# Patient Record
Sex: FEMALE
[De-identification: names, ages, dates, MRNs, and addresses within clinical notes are randomized per-mention and may not be internally consistent; named-entity substitution may affect disease eponyms.]

---

## 2023-03-02 ENCOUNTER — TRANSCRIPTION ENCOUNTER (OUTPATIENT)
Age: 48
End: 2023-03-02

## 2023-03-02 VITALS
HEART RATE: 90 BPM | TEMPERATURE: 98 F | DIASTOLIC BLOOD PRESSURE: 98 MMHG | SYSTOLIC BLOOD PRESSURE: 160 MMHG | OXYGEN SATURATION: 99 % | RESPIRATION RATE: 18 BRPM | HEIGHT: 64 IN | WEIGHT: 123.9 LBS

## 2023-03-02 NOTE — ASU PATIENT PROFILE, ADULT - NSICDXPASTSURGICALHX_GEN_ALL_CORE_FT
PAST SURGICAL HISTORY:  H/O bilateral breast reduction surgery     S/P right knee arthroscopy     [place] teeth extracted

## 2023-03-02 NOTE — ASU PATIENT PROFILE, ADULT - NSICDXPASTMEDICALHX_GEN_ALL_CORE_FT
You are seen in the emergency room today for your shortness of breath and concerns over the last inhaler and spacer. We examined you, gave you a nebulizer treatment. Afterwards your breathing improved. We provided with prescription for albuterol and have handed you a new spacer. You have demonstrated to us that you understand how to use the inhaler and spacer. Please take 1 to 2 puffs every 4 hours as needed for shortness of breath and wheezing. You have informed us that you have an appointment with your PCP on 10/4/2022. Please attend this appointment to be evaluated and for follow-up. Please return emergency department soon as possible if you have worsening symptoms including shortness of breath, chest pain, abdominal pain, nausea, vomiting, loss of consciousness, or any other concerning signs or symptoms. PAST MEDICAL HISTORY:  History of IBS     HTN (hypertension)     Migraine

## 2023-03-02 NOTE — ASU PATIENT PROFILE, ADULT - REASON FOR ADMISSION, PROFILE
robotic assisted laparoscopic excision of endometriosis  , laparoscopic hysterectomy B/L salpingectomy

## 2023-03-03 ENCOUNTER — TRANSCRIPTION ENCOUNTER (OUTPATIENT)
Age: 48
End: 2023-03-03

## 2023-03-03 ENCOUNTER — INPATIENT (INPATIENT)
Facility: HOSPITAL | Age: 48
LOS: 0 days | Discharge: HOME | DRG: 743 | End: 2023-03-04
Attending: OBSTETRICS & GYNECOLOGY | Admitting: OBSTETRICS & GYNECOLOGY
Payer: COMMERCIAL

## 2023-03-03 DIAGNOSIS — Z98.890 OTHER SPECIFIED POSTPROCEDURAL STATES: Chronic | ICD-10-CM

## 2023-03-03 DIAGNOSIS — K08.409 PARTIAL LOSS OF TEETH, UNSPECIFIED CAUSE, UNSPECIFIED CLASS: Chronic | ICD-10-CM

## 2023-03-03 LAB
BLD GP AB SCN SERPL QL: NEGATIVE — SIGNIFICANT CHANGE UP
GLUCOSE BLDC GLUCOMTR-MCNC: 102 MG/DL — HIGH (ref 70–99)
RH IG SCN BLD-IMP: POSITIVE — SIGNIFICANT CHANGE UP

## 2023-03-03 PROCEDURE — 88305 TISSUE EXAM BY PATHOLOGIST: CPT | Mod: 26

## 2023-03-03 PROCEDURE — 88307 TISSUE EXAM BY PATHOLOGIST: CPT | Mod: 26

## 2023-03-03 DEVICE — URETERAL CATH OPEN END 5FR 70CM: Type: IMPLANTABLE DEVICE | Site: BILATERAL | Status: FUNCTIONAL

## 2023-03-03 RX ORDER — ACETAMINOPHEN 500 MG
1000 TABLET ORAL ONCE
Refills: 0 | Status: COMPLETED | OUTPATIENT
Start: 2023-03-03 | End: 2023-03-03

## 2023-03-03 RX ORDER — KETOROLAC TROMETHAMINE 30 MG/ML
30 SYRINGE (ML) INJECTION EVERY 6 HOURS
Refills: 0 | Status: DISCONTINUED | OUTPATIENT
Start: 2023-03-03 | End: 2023-03-04

## 2023-03-03 RX ORDER — PANTOPRAZOLE SODIUM 20 MG/1
40 TABLET, DELAYED RELEASE ORAL EVERY 24 HOURS
Refills: 0 | Status: DISCONTINUED | OUTPATIENT
Start: 2023-03-03 | End: 2023-03-04

## 2023-03-03 RX ORDER — HYDROMORPHONE HYDROCHLORIDE 2 MG/ML
0.5 INJECTION INTRAMUSCULAR; INTRAVENOUS; SUBCUTANEOUS EVERY 6 HOURS
Refills: 0 | Status: DISCONTINUED | OUTPATIENT
Start: 2023-03-03 | End: 2023-03-03

## 2023-03-03 RX ORDER — ACETAMINOPHEN 500 MG
975 TABLET ORAL EVERY 6 HOURS
Refills: 0 | Status: DISCONTINUED | OUTPATIENT
Start: 2023-03-04 | End: 2023-03-04

## 2023-03-03 RX ORDER — POLYETHYLENE GLYCOL 3350 17 G/17G
17 POWDER, FOR SOLUTION ORAL EVERY 24 HOURS
Refills: 0 | Status: DISCONTINUED | OUTPATIENT
Start: 2023-03-03 | End: 2023-03-04

## 2023-03-03 RX ORDER — ERGOCALCIFEROL 1.25 MG/1
1 CAPSULE ORAL
Qty: 0 | Refills: 0 | DISCHARGE

## 2023-03-03 RX ORDER — GABAPENTIN 400 MG/1
300 CAPSULE ORAL ONCE
Refills: 0 | Status: COMPLETED | OUTPATIENT
Start: 2023-03-03 | End: 2023-03-03

## 2023-03-03 RX ORDER — SODIUM CHLORIDE 9 MG/ML
1000 INJECTION, SOLUTION INTRAVENOUS
Refills: 0 | Status: DISCONTINUED | OUTPATIENT
Start: 2023-03-03 | End: 2023-03-04

## 2023-03-03 RX ORDER — ONDANSETRON 8 MG/1
8 TABLET, FILM COATED ORAL EVERY 6 HOURS
Refills: 0 | Status: DISCONTINUED | OUTPATIENT
Start: 2023-03-03 | End: 2023-03-04

## 2023-03-03 RX ORDER — SIMETHICONE 80 MG/1
80 TABLET, CHEWABLE ORAL ONCE
Refills: 0 | Status: COMPLETED | OUTPATIENT
Start: 2023-03-03 | End: 2023-03-03

## 2023-03-03 RX ORDER — SCOPALAMINE 1 MG/3D
1 PATCH, EXTENDED RELEASE TRANSDERMAL ONCE
Refills: 0 | Status: COMPLETED | OUTPATIENT
Start: 2023-03-03 | End: 2023-03-03

## 2023-03-03 RX ORDER — HYDROMORPHONE HYDROCHLORIDE 2 MG/ML
0.5 INJECTION INTRAMUSCULAR; INTRAVENOUS; SUBCUTANEOUS EVERY 4 HOURS
Refills: 0 | Status: DISCONTINUED | OUTPATIENT
Start: 2023-03-03 | End: 2023-03-04

## 2023-03-03 RX ORDER — HYDROCHLOROTHIAZIDE 25 MG
1 TABLET ORAL
Qty: 0 | Refills: 0 | DISCHARGE

## 2023-03-03 RX ORDER — KETOROLAC TROMETHAMINE 30 MG/ML
30 SYRINGE (ML) INJECTION ONCE
Refills: 0 | Status: DISCONTINUED | OUTPATIENT
Start: 2023-03-03 | End: 2023-03-03

## 2023-03-03 RX ORDER — ACETAMINOPHEN 500 MG
1000 TABLET ORAL ONCE
Refills: 0 | Status: DISCONTINUED | OUTPATIENT
Start: 2023-03-03 | End: 2023-03-03

## 2023-03-03 RX ORDER — HYDROMORPHONE HYDROCHLORIDE 2 MG/ML
0.5 INJECTION INTRAMUSCULAR; INTRAVENOUS; SUBCUTANEOUS
Refills: 0 | Status: DISCONTINUED | OUTPATIENT
Start: 2023-03-03 | End: 2023-03-03

## 2023-03-03 RX ADMIN — Medication 400 MILLIGRAM(S): at 20:06

## 2023-03-03 RX ADMIN — ONDANSETRON 8 MILLIGRAM(S): 8 TABLET, FILM COATED ORAL at 23:06

## 2023-03-03 RX ADMIN — Medication 1000 MILLIGRAM(S): at 09:38

## 2023-03-03 RX ADMIN — Medication 1000 MILLIGRAM(S): at 09:45

## 2023-03-03 RX ADMIN — GABAPENTIN 300 MILLIGRAM(S): 400 CAPSULE ORAL at 09:39

## 2023-03-03 RX ADMIN — SIMETHICONE 80 MILLIGRAM(S): 80 TABLET, CHEWABLE ORAL at 23:07

## 2023-03-03 NOTE — PROGRESS NOTE ADULT - ASSESSMENT
48 y/o s/p RA TLH + BS for 14 multifibroid uterus, endometriosis excision, cystoscopy w/ ICG, uncomplicated  The patient is a little dizzy, pain is well controlled, passed TOV.   Will reassess in an hour, if the patient feels better she can go home 46 y/o s/p RA TLH + BS for 14 multifibroid uterus, endometriosis excision, cystoscopy w/ ICG, uncomplicated  The patient is a little dizzy, pain is well controlled, passed TOV.   Will reassess in an hour, if the patient feels better she can go home

## 2023-03-03 NOTE — BRIEF OPERATIVE NOTE - NSICDXBRIEFPROCEDURE_GEN_ALL_CORE_FT
PROCEDURES:  Robot-assisted laparoscopic total hysterectomy with bilateral salpingectomy and cystoscopy using da Brandie Xi 03-Mar-2023 16:27:14  Beau Boykin  Robot-assisted laparoscopic surgical removal of endometriosis using da Brandie Xi 03-Mar-2023 16:27:37  Beau Boykin

## 2023-03-03 NOTE — PROGRESS NOTE ADULT - SUBJECTIVE AND OBJECTIVE BOX
GYN POST-OPERATIVE CHECK  Patient seen at bedside.  Pain controlled. Tolerating sips.  No OOB yet.  Fields in place.    Denies CP, palpitations, SOB, fever, chills, nausea, vomiting.    Vital Signs Last 24 Hrs  T(C): 36.3 (03 Mar 2023 17:06), Max: 36.7 (03 Mar 2023 12:28)  T(F): 97.3 (03 Mar 2023 17:06), Max: 97.3 (03 Mar 2023 17:06)  HR: 75 (03 Mar 2023 19:28) (70 - 90)  BP: 156/81 (03 Mar 2023 19:28) (148/83 - 175/97)  BP(mean): 111 (03 Mar 2023 19:28) (109 - 130)  RR: 19 (03 Mar 2023 19:28) (13 - 19)  SpO2: 99% (03 Mar 2023 19:28) (98% - 100%)    Parameters below as of 03 Mar 2023 19:28  Patient On (Oxygen Delivery Method): nasal cannula  O2 Flow (L/min): 2      Physical Exam:  Gen: No Acute Distress  Pulm: Comfortable on RA  GI: soft, appropriately tender, non-distended,  no rebound, no guarding.  Dressing C/D/I  : voiding  Ext: SCDs in place, no edema/erythema/tenderness    I&O's Summary    03 Mar 2023 07:01  -  03 Mar 2023 20:26  --------------------------------------------------------  IN: 375 mL / OUT: 400 mL / NET: -25 mL      MEDICATIONS  (STANDING):  acetaminophen     Tablet .. 1000 milliGRAM(s) Oral Once  HYDROmorphone  Injectable 0.5 milliGRAM(s) IV Push every 6 hours  ketorolac   Injectable 30 milliGRAM(s) IV Push Once  lactated ringers. 1000 milliLiter(s) (125 mL/Hr) IV Continuous <Continuous>  pantoprazole  Injectable 40 milliGRAM(s) IV Push every 24 hours  polyethylene glycol 3350 17 Gram(s) Oral every 24 hours  simethicone 80 milliGRAM(s) Chew Once    MEDICATIONS  (PRN):  HYDROmorphone  Injectable 0.5 milliGRAM(s) IV Push every 15 minutes PRN Severe Pain (7 - 10)  ondansetron Injectable 8 milliGRAM(s) IV Push every 6 hours PRN Nausea and/or Vomiting    Allergies    erythromycin (Vomiting)  Reglan (Vomiting)

## 2023-03-03 NOTE — H&P ADULT - ASSESSMENT
48 y/o G0 presenting for scheduled RA TLH + BS and endometriosis excision.    - Cleared for OR  - Consent signed  - NPO

## 2023-03-03 NOTE — BRIEF OPERATIVE NOTE - OPERATION/FINDINGS
Cystoscopy performed w/ b/l ICG injection, Cervix serially dilated and v-care inserted without difficulty. Abdomen insufflated w/ veress needle. 8mm robotic port placed in umbilicus w/ b/l 8mm ports and L lateral 5mm assist port. Robot docked. Pelvic exam significant for large multifibroid uterus, grossly normal appearing b/l fallopian tubes and ovaries. Large fundal fibroid removed prior to performing TLH. Uterus bisected after amputation and 6cm intramural fibroid removed in order to deliver uterus through the vagina. Bilateral salpingectomy then performed and tubes removed through the vagina. Vaginal cuff closed w/ v-lock suture. Multiple suspected endometriosis lesions dissected from pelvis. Robot undocked. Surgical pause performed, excellent hemostasis. Cystoscopy repeated and b/l ureteral jets visualized.

## 2023-03-03 NOTE — PRE-ANESTHESIA EVALUATION ADULT - NSANTHOSAYNRD_GEN_A_CORE
No. YURI screening performed.  STOP BANG Legend: 0-2 = LOW Risk; 3-4 = INTERMEDIATE Risk; 5-8 = HIGH Risk

## 2023-03-03 NOTE — ASU DISCHARGE PLAN (ADULT/PEDIATRIC) - NS MD DC FALL RISK RISK
For information on Fall & Injury Prevention, visit: [email or web address] OR  [email or web address] OR  https://www.cdc.gov/steadi/patient.html

## 2023-03-03 NOTE — H&P ADULT - HISTORY OF PRESENT ILLNESS
48 y/o G0 (LMP 2/10/23) presenting for scheduled RA hysterectomy and endometriosis excision. Pt is feeling well. Denies lightheadedness/dizziness, fevers/chills, chest pain, palpitations, SOB, abdominal pain, N/V/D, heavy vaginal bleeding, vagina itching/burning, abnormal vaginal discharge, urinary frequency/urgency, or dysuria.    ObHx: G0  GynHx: known fibroids, denies abnormal pap/STI/ovarian cysts  PMH:  46 y/o G0 (LMP 2/10/23) presenting for scheduled RA hysterectomy and endometriosis excision. Pt is feeling well. Denies lightheadedness/dizziness, fevers/chills, chest pain, palpitations, SOB, abdominal pain, N/V/D, heavy vaginal bleeding, vagina itching/burning, abnormal vaginal discharge, urinary frequency/urgency, or dysuria.    ObHx: G0  GynHx: known fibroids, denies abnormal pap/STI/ovarian cysts  PMH: hypocalcemia, HTN  PSH: dx l/s for endometriosis diagnosis  Meds: HCTZ 12.5mg QD  Allergies: "IV antinausea med" - spine pain 46 y/o G0 (LMP 2/10/23) presenting for scheduled RA hysterectomy and endometriosis excision. Pt is feeling well. Denies lightheadedness/dizziness, fevers/chills, chest pain, palpitations, SOB, abdominal pain, N/V/D, heavy vaginal bleeding, vagina itching/burning, abnormal vaginal discharge, urinary frequency/urgency, or dysuria.    ObHx: G0  GynHx: known fibroids, denies abnormal pap/STI/ovarian cysts  PMH: hypocalcemia, HTN  PSH: dx l/s for endometriosis diagnosis  Meds: HCTZ 12.5mg QD  Allergies: reglan - spine pain

## 2023-03-03 NOTE — BRIEF OPERATIVE NOTE - NSICDXBRIEFPOSTOP_GEN_ALL_CORE_FT
POST-OP DIAGNOSIS:  Fibroids 03-Mar-2023 16:28:14  Beau Boykin  Endometriosis 03-Mar-2023 16:28:22  Beau Boyikn  Pelvic pain 03-Mar-2023 16:28:29  Beau Boykin

## 2023-03-03 NOTE — BRIEF OPERATIVE NOTE - NSICDXBRIEFPREOP_GEN_ALL_CORE_FT
PRE-OP DIAGNOSIS:  Fibroids 03-Mar-2023 16:27:48  Beau Boykin  Endometriosis 03-Mar-2023 16:27:56  Beau Boykin  Pelvic pain 03-Mar-2023 16:28:03  Beau Boykin

## 2023-03-03 NOTE — ASU DISCHARGE PLAN (ADULT/PEDIATRIC) - CARE PROVIDER_API CALL
Sandra Gray)  Obstetrics and Gynecology  [address] [place] [address]  Phone: [phone]  Fax: [phone]  Established Patient  Follow Up Time:

## 2023-03-04 ENCOUNTER — TRANSCRIPTION ENCOUNTER (OUTPATIENT)
Age: 48
End: 2023-03-04

## 2023-03-04 VITALS
OXYGEN SATURATION: 95 % | TEMPERATURE: 98 F | DIASTOLIC BLOOD PRESSURE: 93 MMHG | SYSTOLIC BLOOD PRESSURE: 160 MMHG | RESPIRATION RATE: 16 BRPM | HEART RATE: 81 BPM

## 2023-03-04 LAB
ANION GAP SERPL CALC-SCNC: 10 MMOL/L — SIGNIFICANT CHANGE UP (ref 5–17)
BASOPHILS # BLD AUTO: 0.02 K/UL — SIGNIFICANT CHANGE UP (ref 0–0.2)
BASOPHILS NFR BLD AUTO: 0.1 % — SIGNIFICANT CHANGE UP (ref 0–2)
BILIRUB SERPL-MCNC: 0.4 MG/DL — SIGNIFICANT CHANGE UP (ref 0.2–1.2)
BUN SERPL-MCNC: 6 MG/DL — LOW (ref 7–23)
CALCIUM SERPL-MCNC: 8.6 MG/DL — SIGNIFICANT CHANGE UP (ref 8.4–10.5)
CHLORIDE SERPL-SCNC: 105 MMOL/L — SIGNIFICANT CHANGE UP (ref 96–108)
CO2 SERPL-SCNC: 25 MMOL/L — SIGNIFICANT CHANGE UP (ref 22–31)
CREAT SERPL-MCNC: 0.54 MG/DL — SIGNIFICANT CHANGE UP (ref 0.5–1.3)
EGFR: 114 ML/MIN/1.73M2 — SIGNIFICANT CHANGE UP
EOSINOPHIL # BLD AUTO: 0 K/UL — SIGNIFICANT CHANGE UP (ref 0–0.5)
EOSINOPHIL NFR BLD AUTO: 0 % — SIGNIFICANT CHANGE UP (ref 0–6)
GLUCOSE SERPL-MCNC: 82 MG/DL — SIGNIFICANT CHANGE UP (ref 70–99)
HCT VFR BLD CALC: 37.2 % — SIGNIFICANT CHANGE UP (ref 34.5–45)
HGB BLD-MCNC: 12 G/DL — SIGNIFICANT CHANGE UP (ref 11.5–15.5)
IMM GRANULOCYTES NFR BLD AUTO: 0.4 % — SIGNIFICANT CHANGE UP (ref 0–0.9)
INR BLD: 1.12 — SIGNIFICANT CHANGE UP (ref 0.88–1.16)
LYMPHOCYTES # BLD AUTO: 16.4 % — SIGNIFICANT CHANGE UP (ref 13–44)
LYMPHOCYTES # BLD AUTO: 2.21 K/UL — SIGNIFICANT CHANGE UP (ref 1–3.3)
MCHC RBC-ENTMCNC: 29.1 PG — SIGNIFICANT CHANGE UP (ref 27–34)
MCHC RBC-ENTMCNC: 32.3 GM/DL — SIGNIFICANT CHANGE UP (ref 32–36)
MCV RBC AUTO: 90.1 FL — SIGNIFICANT CHANGE UP (ref 80–100)
MELD SCORE WITH DIALYSIS: 21 POINTS — SIGNIFICANT CHANGE UP
MELD SCORE WITHOUT DIALYSIS: 8 POINTS — SIGNIFICANT CHANGE UP
MONOCYTES # BLD AUTO: 0.99 K/UL — HIGH (ref 0–0.9)
MONOCYTES NFR BLD AUTO: 7.3 % — SIGNIFICANT CHANGE UP (ref 2–14)
NEUTROPHILS # BLD AUTO: 10.24 K/UL — HIGH (ref 1.8–7.4)
NEUTROPHILS NFR BLD AUTO: 75.8 % — SIGNIFICANT CHANGE UP (ref 43–77)
NRBC # BLD: 0 /100 WBCS — SIGNIFICANT CHANGE UP (ref 0–0)
PLATELET # BLD AUTO: 280 K/UL — SIGNIFICANT CHANGE UP (ref 150–400)
POTASSIUM SERPL-MCNC: 3.2 MMOL/L — LOW (ref 3.5–5.3)
POTASSIUM SERPL-SCNC: 3.2 MMOL/L — LOW (ref 3.5–5.3)
PROTHROM AB SERPL-ACNC: 13.3 SEC — SIGNIFICANT CHANGE UP (ref 10.5–13.4)
RBC # BLD: 4.13 M/UL — SIGNIFICANT CHANGE UP (ref 3.8–5.2)
RBC # FLD: 13.2 % — SIGNIFICANT CHANGE UP (ref 10.3–14.5)
SODIUM SERPL-SCNC: 140 MMOL/L — SIGNIFICANT CHANGE UP (ref 135–145)
WBC # BLD: 13.51 K/UL — HIGH (ref 3.8–10.5)
WBC # FLD AUTO: 13.51 K/UL — HIGH (ref 3.8–10.5)

## 2023-03-04 PROCEDURE — 36415 COLL VENOUS BLD VENIPUNCTURE: CPT

## 2023-03-04 PROCEDURE — 86901 BLOOD TYPING SEROLOGIC RH(D): CPT

## 2023-03-04 PROCEDURE — S2900: CPT

## 2023-03-04 PROCEDURE — 86850 RBC ANTIBODY SCREEN: CPT

## 2023-03-04 PROCEDURE — C1758: CPT

## 2023-03-04 PROCEDURE — 86900 BLOOD TYPING SEROLOGIC ABO: CPT

## 2023-03-04 PROCEDURE — 80048 BASIC METABOLIC PNL TOTAL CA: CPT

## 2023-03-04 PROCEDURE — 88305 TISSUE EXAM BY PATHOLOGIST: CPT

## 2023-03-04 PROCEDURE — 88307 TISSUE EXAM BY PATHOLOGIST: CPT

## 2023-03-04 PROCEDURE — 82962 GLUCOSE BLOOD TEST: CPT

## 2023-03-04 PROCEDURE — 85025 COMPLETE CBC W/AUTO DIFF WBC: CPT

## 2023-03-04 RX ORDER — ACETAMINOPHEN 500 MG
3 TABLET ORAL
Qty: 0 | Refills: 0 | DISCHARGE
Start: 2023-03-04

## 2023-03-04 RX ORDER — HYDROMORPHONE HYDROCHLORIDE 2 MG/ML
0.5 INJECTION INTRAMUSCULAR; INTRAVENOUS; SUBCUTANEOUS EVERY 6 HOURS
Refills: 0 | Status: DISCONTINUED | OUTPATIENT
Start: 2023-03-04 | End: 2023-03-04

## 2023-03-04 RX ORDER — POTASSIUM CHLORIDE 20 MEQ
40 PACKET (EA) ORAL ONCE
Refills: 0 | Status: COMPLETED | OUTPATIENT
Start: 2023-03-04 | End: 2023-03-04

## 2023-03-04 RX ORDER — METHOCARBAMOL 500 MG/1
2 TABLET, FILM COATED ORAL
Qty: 24 | Refills: 0
Start: 2023-03-04 | End: 2023-03-06

## 2023-03-04 RX ADMIN — Medication 40 MILLIEQUIVALENT(S): at 10:51

## 2023-03-04 RX ADMIN — Medication 30 MILLIGRAM(S): at 00:59

## 2023-03-04 RX ADMIN — Medication 30 MILLIGRAM(S): at 00:31

## 2023-03-04 RX ADMIN — SODIUM CHLORIDE 125 MILLILITER(S): 9 INJECTION, SOLUTION INTRAVENOUS at 06:17

## 2023-03-04 NOTE — DISCHARGE NOTE PROVIDER - NSDCCPCAREPLAN_GEN_ALL_CORE_FT
PRINCIPAL DISCHARGE DIAGNOSIS  Diagnosis: Endometriosis, unspecified  Assessment and Plan of Treatment: Community Status: Active

## 2023-03-04 NOTE — DISCHARGE NOTE PROVIDER - NSDCMRMEDTOKEN_GEN_ALL_CORE_FT
acetaminophen 325 mg oral tablet: 3 tab(s) orally every 6 hours  hydroCHLOROthiazide 12.5 mg oral capsule: 1 cap(s) orally once a day  Vitamin D2 50 mcg (2000 intl units) oral capsule: 1 cap(s) orally once a day   acetaminophen 325 mg oral tablet: 3 tab(s) orally every 6 hours  hydroCHLOROthiazide 12.5 mg oral capsule: 1 cap(s) orally once a day  methocarbamol 750 mg oral tablet: 2 tab(s) orally every 6 hours, As Needed for pain  Vitamin D2 50 mcg (2000 intl units) oral capsule: 1 cap(s) orally once a day

## 2023-03-04 NOTE — PROGRESS NOTE ADULT - ASSESSMENT
48 y/o POD1 s/p RA TLH + BS for 14 multifibroid uterus, endometriosis excision, cystoscopy w/ ICG, uncomplicated    Neuro: tylenol/toradol, dilaudid PRN  CV: VSS, HCTZ (home med)  Pulm: BG  GI: LRD, heplock, zofran, simethicone, protonix, miralax, -/-  : s/p kahn, voiding  DVT ppx: SCDs  Dispo: plan for discharge today

## 2023-03-04 NOTE — DISCHARGE NOTE NURSING/CASE MANAGEMENT/SOCIAL WORK - PATIENT PORTAL LINK FT
You can access the FollowMyHealth Patient Portal offered by [facility] by registering at the following website: [email or web address]. By joining [employer]’s FollowMyHealth portal, you will also be able to view your health information using other applications (apps) compatible with our system.

## 2023-03-04 NOTE — DISCHARGE NOTE PROVIDER - HOSPITAL COURSE
48 y/o s/p RA TLH + BS for 14 multifibroid uterus, endometriosis excision, cystoscopy w/ ICG, uncomplicated. She was admitted for post-operative monitoring and pain control. She was discharged on POD1 after meeting all post-operative milestones, clinically and hemodynamically stable.

## 2023-03-04 NOTE — PROGRESS NOTE ADULT - SUBJECTIVE AND OBJECTIVE BOX
Patient evaluated at bedside. She has no complaints. She reports pain is well controlled with medications. She denies HA, dizziness, SOB, CP, palpitations, n/v. Nausea has significantly improved.  She is ambulating without assistance, tolerating regular diet, passing flatus, and voiding spontaneously.    T(C): 36.7 (03-04-23 @ 04:38), Max: 36.9 (03-04-23 @ 00:18)  HR: 97 (03-04-23 @ 04:38) (64 - 97)  BP: 156/77 (03-04-23 @ 04:38) (152/84 - 171/84)  RR: 18 (03-04-23 @ 04:38) (18 - 20)  SpO2: 95% (03-04-23 @ 04:38) (95% - 97%)    GA: well appearing  Resp: normal respiratory effort  Abd: +BS, soft, NT/ND, no rebound or guarding, incisions clean/ dry/ intact  Extrem: SCDs in place, no LE edema       03-03 @ 07:01  -  03-04 @ 07:00  --------------------------------------------------------  IN: 1250 mL / OUT: 1975 mL / NET: -725 mL                              12.0   13.51 )-----------( 280      ( 04 Mar 2023 08:03 )             37.2

## 2023-03-04 NOTE — DISCHARGE NOTE NURSING/CASE MANAGEMENT/SOCIAL WORK - NSDCPEFALRISK_GEN_ALL_CORE
For information on Fall & Injury Prevention, visit: [email or web address] OR  [email or web address] OR  https://www.cdc.gov/steadi/patient.html

## 2023-03-04 NOTE — DISCHARGE NOTE PROVIDER - NSDCCPTREATMENT_GEN_ALL_CORE_FT
PRINCIPAL PROCEDURE  Procedure: Robot-assisted laparoscopic total hysterectomy with bilateral salpingectomy and cystoscopy using da Brandie Xi  Findings and Treatment:

## 2023-03-04 NOTE — DISCHARGE NOTE PROVIDER - CARE PROVIDER_API CALL
Sandra Gray)  Obstetrics and Gynecology  [address] [place] [id number]  Phone: [phone]  Fax: [phone]  Follow Up Time:

## 2023-03-09 DIAGNOSIS — N93.9 ABNORMAL UTERINE AND VAGINAL BLEEDING, UNSPECIFIED: ICD-10-CM

## 2023-03-09 DIAGNOSIS — D25.9 LEIOMYOMA OF UTERUS, UNSPECIFIED: ICD-10-CM

## 2023-03-09 DIAGNOSIS — Z88.1 ALLERGY STATUS TO OTHER ANTIBIOTIC AGENTS STATUS: ICD-10-CM

## 2023-03-09 DIAGNOSIS — R10.2 PELVIC AND PERINEAL PAIN: ICD-10-CM

## 2023-03-09 DIAGNOSIS — I10 ESSENTIAL (PRIMARY) HYPERTENSION: ICD-10-CM

## 2023-03-09 DIAGNOSIS — N80.399 ENDOMETRIOSIS OF THE PELVIC PERITONEUM, OTHER SPECIFIED SITES, UNSPECIFIED DEPTH: ICD-10-CM

## 2023-03-09 DIAGNOSIS — K58.9 IRRITABLE BOWEL SYNDROME WITHOUT DIARRHEA: ICD-10-CM

## 2023-03-09 DIAGNOSIS — Z91.09 OTHER ALLERGY STATUS, OTHER THAN TO DRUGS AND BIOLOGICAL SUBSTANCES: ICD-10-CM

## 2023-03-16 LAB — SURGICAL PATHOLOGY STUDY: SIGNIFICANT CHANGE UP

## 2024-05-16 PROBLEM — I10 ESSENTIAL (PRIMARY) HYPERTENSION: Chronic | Status: ACTIVE | Noted: 2023-03-02

## 2024-05-16 PROBLEM — Z87.19 PERSONAL HISTORY OF OTHER DISEASES OF THE DIGESTIVE SYSTEM: Chronic | Status: ACTIVE | Noted: 2023-03-02

## 2024-05-16 PROBLEM — G43.909 MIGRAINE, UNSPECIFIED, NOT INTRACTABLE, WITHOUT STATUS MIGRAINOSUS: Chronic | Status: ACTIVE | Noted: 2023-03-02

## 2024-05-29 ENCOUNTER — APPOINTMENT (OUTPATIENT)
Dept: ENDOCRINOLOGY | Facility: CLINIC | Age: 49
End: 2024-05-29
Payer: COMMERCIAL

## 2024-05-29 VITALS
DIASTOLIC BLOOD PRESSURE: 90 MMHG | SYSTOLIC BLOOD PRESSURE: 145 MMHG | OXYGEN SATURATION: 98 % | TEMPERATURE: 97.7 F | HEART RATE: 81 BPM | BODY MASS INDEX: 23.1 KG/M2 | WEIGHT: 132 LBS | HEIGHT: 63.5 IN

## 2024-05-29 DIAGNOSIS — E55.9 VITAMIN D DEFICIENCY, UNSPECIFIED: ICD-10-CM

## 2024-05-29 DIAGNOSIS — Z00.00 ENCOUNTER FOR GENERAL ADULT MEDICAL EXAMINATION W/OUT ABNORMAL FINDINGS: ICD-10-CM

## 2024-05-29 DIAGNOSIS — Z87.891 PERSONAL HISTORY OF NICOTINE DEPENDENCE: ICD-10-CM

## 2024-05-29 DIAGNOSIS — I10 ESSENTIAL (PRIMARY) HYPERTENSION: ICD-10-CM

## 2024-05-29 DIAGNOSIS — Z78.9 OTHER SPECIFIED HEALTH STATUS: ICD-10-CM

## 2024-05-29 DIAGNOSIS — R94.6 ABNORMAL RESULTS OF THYROID FUNCTION STUDIES: ICD-10-CM

## 2024-05-29 PROCEDURE — 99204 OFFICE O/P NEW MOD 45 MIN: CPT

## 2024-05-29 RX ORDER — HYDROCHLOROTHIAZIDE 25 MG/1
25 TABLET ORAL
Refills: 0 | Status: ACTIVE | COMMUNITY

## 2024-05-29 RX ORDER — ERGOCALCIFEROL 1.25 MG/1
50000 CAPSULE ORAL
Refills: 0 | Status: ACTIVE | COMMUNITY

## 2024-05-29 NOTE — ASSESSMENT
[FreeTextEntry1] : 49-year-old female here for evaluation of abnormal thyroid bloodwork.   Abnormal TFTs  -Will check TSH, FT4, TT3 and TPO ab  -Will call back if there is any abnormality   Vitamin D def -On 50 K weekly of Vitamin D  -Will check levels  -Follow up as per results

## 2024-05-29 NOTE — PHYSICAL EXAM
[Alert] : alert [No Acute Distress] : no acute distress [Normal Sclera/Conjunctiva] : normal sclera/conjunctiva [PERRL] : pupils equal, round and reactive to light [Normal Outer Ear/Nose] : the ears and nose were normal in appearance [Normal TMs] : both tympanic membranes were normal [No Neck Mass] : no neck mass was observed [Thyroid Not Enlarged] : the thyroid was not enlarged [No Respiratory Distress] : no respiratory distress [Clear to Auscultation] : lungs were clear to auscultation bilaterally [Normal S1, S2] : normal S1 and S2 [Normal Rate] : heart rate was normal [Regular Rhythm] : with a regular rhythm [Normal Bowel Sounds] : normal bowel sounds [Soft] : abdomen soft [Normal Gait] : normal gait [No Joint Swelling] : no joint swelling seen [No Rash] : no rash [No Skin Lesions] : no skin lesions [Oriented x3] : oriented to person, place, and time [Normal Affect] : the affect was normal [Normal Insight/Judgement] : insight and judgment were intact

## 2024-05-29 NOTE — REVIEW OF SYSTEMS
[Fatigue] : no fatigue [Decreased Appetite] : appetite not decreased [Recent Weight Gain (___ Lbs)] : no recent weight gain [Recent Weight Loss (___ Lbs)] : no recent weight loss [Visual Field Defect] : no visual field defect [Dry Eyes] : no dryness [Dysphagia] : no dysphagia [Neck Pain] : no neck pain [Dysphonia] : no dysphonia [Nasal Congestion] : no nasal congestion [Chest Pain] : no chest pain [Palpitations] : no palpitations [Shortness Of Breath] : no shortness of breath [Nausea] : no nausea [Vomiting] : no vomiting [Polyuria] : no polyuria [Irregular Menses] : regular menses [Joint Pain] : no joint pain [Acanthosis] : no acanthosis  [Acne] : no acne [Headaches] : no headaches [Dizziness] : no dizziness [Tremors] : no tremors [Pain/Numbness of Digits] : no pain/numbness of digits [Depression] : no depression [Polydipsia] : no polydipsia [Cold Intolerance] : no cold intolerance [Easy Bleeding] : no ~M tendency for easy bleeding [Easy Bruising] : no tendency for easy bruising

## 2024-05-29 NOTE — HISTORY OF PRESENT ILLNESS
[FreeTextEntry1] : 49 year old female here for evaluation of abnormal TFTs.  Patient reported that she has been having increased fatigue, increased emotions, sleep disturbances. Some hot flashes. No vaginal dryness. She seeked care with Ob/gyn to check if she is in menopause since she had a hysterectomy. She was found to have a normal TSH, T4 and a low T3 uptake, and later sent to the endocrinologist.

## 2024-06-07 LAB
25(OH)D3 SERPL-MCNC: 59.6 NG/ML
T3 SERPL-MCNC: 156 NG/DL
T4 FREE SERPL-MCNC: 1.3 NG/DL
THYROGLOB AB SERPL-ACNC: <20 IU/ML
THYROPEROXIDASE AB SERPL IA-ACNC: 16.3 IU/ML
TSH SERPL-ACNC: 2.05 UIU/ML

## 2024-06-11 ENCOUNTER — NON-APPOINTMENT (OUTPATIENT)
Age: 49
End: 2024-06-11

## 2024-06-12 ENCOUNTER — TRANSCRIPTION ENCOUNTER (OUTPATIENT)
Age: 49
End: 2024-06-12

## 2024-09-01 NOTE — ASU PATIENT PROFILE, ADULT - WILL THE PATIENT ACCEPT THE PFIZER COVID-19 VACCINE IF ELIGIBLE AND IT IS AVAILABLE?
You have been prescribed prednisone. Take with food. Do NOT take any NSAID products (motrin, ibuprofen, aleve, advil) while taking this medication.  You may take tylenol.   You may take claritin for post nasal drip.  Stop smoking    You are to see your PCP for the lump on the spine that is tender.  Take only tylenol while taking the prednisone for pain.      
No

## 2025-03-12 NOTE — H&P ADULT - NSICDXPASTSURGICALHX_GEN_ALL_CORE_FT
PAST SURGICAL HISTORY:  H/O bilateral breast reduction surgery     S/P right knee arthroscopy     [place] teeth extracted     
daughter

## (undated) DEVICE — VENODYNE/SCD SLEEVE CALF MEDIUM

## (undated) DEVICE — TUBING STRYKER PNEUMOCLEAR SMOKE EVACUATION HIGH FLOW

## (undated) DEVICE — TROCAR COOPER SURGICAL LAPAROSCOPIC CANNULA 5MM

## (undated) DEVICE — ELCTR CUTTING LOOP FOR PRINCESS RESECTOSCOPE 21FR

## (undated) DEVICE — GLV 6.5 PROTEXIS (WHITE)

## (undated) DEVICE — DRAPE LIGHT HANDLE COVER (BLUE)

## (undated) DEVICE — PACK GENERAL CLOSING

## (undated) DEVICE — PACK GYN WDC

## (undated) DEVICE — WARMING BLANKET UPPER ADULT

## (undated) DEVICE — XI DRAPE ARM

## (undated) DEVICE — ENDOPATH 5MM CVD SCISSOR W MONOPOLAR CAUTERY DISP

## (undated) DEVICE — SUT PDS II 0 27" CT-1

## (undated) DEVICE — DRSG STERISTRIPS 0.5 X 4"

## (undated) DEVICE — XI ARM FORCEP PROGRASP 8MM

## (undated) DEVICE — SUT VICRYL PLUS 0 36" CT-1

## (undated) DEVICE — SHEARS HARMONIC ACE 5MM X 36CM CURVED TIP

## (undated) DEVICE — SUT VICRYL 1 18" CT-1 UNDYED (POP-OFF)

## (undated) DEVICE — Device

## (undated) DEVICE — ENDO LOOP LINA GOLD LOOP FOR LSH 200MM X 100MM LG CONNECTOR

## (undated) DEVICE — SUT ETHIBOND 0 30" CT-2

## (undated) DEVICE — XI SEAL UNIV 5- 8 MM

## (undated) DEVICE — POSITIONER FOAM EGG CRATE ULNAR 2PCS (PINK)

## (undated) DEVICE — SUT MAXON 0 30" HGU-46

## (undated) DEVICE — XI OBTURATOR OPTICAL BLADELESS 8MM

## (undated) DEVICE — PREP CHLORAPREP HI-LITE ORANGE 26ML

## (undated) DEVICE — BLADE SURGICAL #12 CARBON STEEL

## (undated) DEVICE — TUBING TUR 2 PRONG

## (undated) DEVICE — FOLEY TRAY 16FR 5CC LF UMETER CLOSED

## (undated) DEVICE — XI ARM FORCEP MARYLAND BIPOLAR

## (undated) DEVICE — TUBING RANGER FLUID IRRIGATION SET DISP

## (undated) DEVICE — CATH ANGIOCATH 12G

## (undated) DEVICE — GLV 8 PROTEXIS (WHITE)

## (undated) DEVICE — ENDOCATCH II 15MM

## (undated) DEVICE — PACK DEL VAG LNX SURGICOUNT

## (undated) DEVICE — XI DRAPE COLUMN

## (undated) DEVICE — ELCTR THUNDERBEAT HANDPIECE 5MM X 35CM FRONT CONTROL

## (undated) DEVICE — TROCAR ETHICON ENDOPATH MINI BLADELESS 2/3MM X 100MM SMOOTH

## (undated) DEVICE — TROCAR COOPER SURGICAL PYRAMIDAL TIP 5X10.16

## (undated) DEVICE — INSUFFLATION NDL COVIDIEN SURGINEEDLE VERESS 120MM

## (undated) DEVICE — SUT SILK 0 18" TIES

## (undated) DEVICE — SOL IRR BAG NS 0.9% 3000ML

## (undated) DEVICE — XI TIP COVER

## (undated) DEVICE — FOLEY TRAY 14FR 5CC LF UMETER CLOSED

## (undated) DEVICE — DRAPE 3/4 SHEET 52X76"

## (undated) DEVICE — ENDOCATCH 10MM SPECIMEN POUCH

## (undated) DEVICE — LIGASURE MARYLAND 37CM

## (undated) DEVICE — DRAPE STERI-DRAPE INCISE 32X33"

## (undated) DEVICE — RAYTEC SPONGE 4X4 16PLY

## (undated) DEVICE — NDL GRASPING DISP

## (undated) DEVICE — TROCAR ETHICON ENDOPATH XCEL BLADELESS 11MM X 100MM STABILITY

## (undated) DEVICE — XI ARM SCISSOR MONO CURVED

## (undated) DEVICE — XI ARM GRASPER TIP UP FENESTRATED

## (undated) DEVICE — DRAPE SURGICAL #1010

## (undated) DEVICE — SUT VICRYL 0 27" UR-6

## (undated) DEVICE — DRAINAGE BAG URINARY 4000CC

## (undated) DEVICE — BLADE SURGICAL #10 CARBON

## (undated) DEVICE — DRAPE INSTRUMENT POUCH 10" X 18"

## (undated) DEVICE — TUBING STRYKER ARTHROSCOPY INFLOW

## (undated) DEVICE — SUT MONOCRYL 4-0 27" PS-2 UNDYED

## (undated) DEVICE — D HELP - CLEARVIEW CLEARIFY SYSTEM

## (undated) DEVICE — TUBING AQUILEX OUTFLOW

## (undated) DEVICE — POSITIONER PINK PAD PIGAZZI SYSTEM XL W ARM PROTECTOR

## (undated) DEVICE — DRSG GAUZE VASELINE PETROLEUM 3 X 9"

## (undated) DEVICE — SUT MONOCRYL 2-0 27" SH VIOLET

## (undated) DEVICE — SUT VICRYL 2-0 27" SH